# Patient Record
Sex: FEMALE | Race: WHITE | NOT HISPANIC OR LATINO | Employment: FULL TIME | URBAN - METROPOLITAN AREA
[De-identification: names, ages, dates, MRNs, and addresses within clinical notes are randomized per-mention and may not be internally consistent; named-entity substitution may affect disease eponyms.]

---
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
## 2020-03-03 ENCOUNTER — HOSPITAL ENCOUNTER (EMERGENCY)
Facility: CLINIC | Age: 20
Discharge: HOME | End: 2020-03-03
Attending: INTERNAL MEDICINE
Payer: COMMERCIAL

## 2020-03-03 VITALS
BODY MASS INDEX: 28.44 KG/M2 | HEIGHT: 72 IN | RESPIRATION RATE: 16 BRPM | SYSTOLIC BLOOD PRESSURE: 126 MMHG | TEMPERATURE: 98.2 F | DIASTOLIC BLOOD PRESSURE: 80 MMHG | HEART RATE: 78 BPM | OXYGEN SATURATION: 98 % | WEIGHT: 210 LBS

## 2020-03-03 DIAGNOSIS — S51.812A FOREARM LACERATION, LEFT, INITIAL ENCOUNTER: ICD-10-CM

## 2020-03-03 DIAGNOSIS — Z72.89 SELF-INJURIOUS BEHAVIOR: ICD-10-CM

## 2020-03-03 DIAGNOSIS — F41.1 GENERALIZED ANXIETY DISORDER: ICD-10-CM

## 2020-03-03 RX ORDER — LIDOCAINE HYDROCHLORIDE AND EPINEPHRINE 10; 10 MG/ML; UG/ML
10 INJECTION, SOLUTION INFILTRATION; PERINEURAL ONCE
Status: DISCONTINUED | OUTPATIENT
Start: 2020-03-03 | End: 2020-03-03 | Stop reason: HOSPADM

## 2020-03-03 ASSESSMENT — MIFFLIN-ST. JEOR: SCORE: 1839.55

## 2020-03-03 NOTE — DISCHARGE INSTRUCTIONS
Treating Anxiety Disorders with Therapy    If you have an anxiety disorder, you don t have to suffer anymore. Treatment is available. Therapy (also called counseling) is often a helpful treatment for anxiety disorders. With therapy, a specially trained professional (therapist) helps you face and learn to manage your anxiety. Therapy can be short-term or long-term depending on your needs. In some cases, medicine may also be prescribed with therapy. It may take time before you notice how much therapy is helping, but stick with it. With therapy, you can feel better.  Cognitive behavioral therapy (CBT)  Cognitive behavioral therapy (CBT) teaches you to manage anxiety. It does this by helping you understand how you think and act when you re anxious. Research has shown CBT to be a very effective treatment for anxiety disorders. How CBT is run is almost like a class. It involves homework and activities to build skills that teach you to cope with anxiety step by step. It can be done in a group or one-on-one, and often takes place for a set number of sessions. CBT has two main parts:  Cognitive therapy helps you identify the negative, irrational thoughts that occur with your anxiety. You ll learn to replace these with more positive, realistic thoughts.  Behavioral therapy helps you change how you react to anxiety. You ll learn coping skills and methods for relaxing to help you better deal with anxiety.  Other forms of therapy  Other therapy methods may work better for you than CBT. Or, you may move from CBT to another form of therapy as your treatment needs change. This may mean meeting with a therapist by yourself or in a group. Therapy can also help you work through problems in your life, such as drug or alcohol dependence, that may be making your anxiety worse.  Getting better takes time  Therapy will help you feel better and teach you skills to help manage anxiety long term. But change doesn t happen right away. It  takes a commitment from you. And treatment only works if you learn to face the causes of your anxiety. So, you might feel worse before you feel better. This can sometimes make it hard to stick with it. But remember: Therapy is a very effective treatment. The results will be well worth it.  Helping yourself  If anxiety is wearing you down, here are some things you can do to cope:  Check with your doctor and rule out any physical problems that may be causing the anxiety symptoms.  If an anxiety disorder is diagnosed, seek mental healthcare. This is an illness and it can respond to treatment. Most types of anxiety disorders will respond to talk therapy and medicine.  Educate yourself about anxiety disorders. Keep track of helpful online resources and books you can use during stressful periods.  Try stress management techniques such as meditation.  Consider online or in-person support groups.  Don t fight your feelings. Anxiety feeds itself. The more you worry about it, the worse it gets. Instead, try to identify what might have triggered your anxiety. Then try to put this threat in perspective.  Keep in mind that you can t control everything about a situation. Change what you can and let the rest take its course.  Exercise -- it s a great way to relieve tension and help your body feel relaxed.  Examine your life for stress, and try to find ways to reduce it.  Avoid caffeine and nicotine, which can make anxiety symptoms worse.  Fight the temptation to turn to alcohol or unprescribed drugs for relief. They only make things worse in the long run.   Date Last Reviewed: 1/1/2017 2000-2019 [employer] [address], [place] [address]. All rights reserved. This information is not intended as a substitute for professional medical care. Always follow your healthcare professional's instructions.    Please make an appointment to follow up with Your Primary Care Provider for suture removal in 7-10 days  even if entirely better.     Please make an appointment to follow up with Mental Health Providers as soon as possible even if entirely better.

## 2020-03-03 NOTE — ED AVS SNAPSHOT
[facility], [place], Emergency Department  [address] [place] [id number]  Phone:  [phone]                                    Licha Moreland   MRN: [id number]    Department:  [facility], Emergency Department   Date of Visit:  3/3/2020           After Visit Summary Signature Page    I have received my discharge instructions, and my questions have been answered. I have discussed any challenges I see with this plan with the nurse or doctor.    ..........................................................................................................................................  Patient/Patient Representative Signature      ..........................................................................................................................................  Patient Representative Print Name and Relationship to Patient    ..................................................               ................................................  Date                                   Time    ..........................................................................................................................................  Reviewed by Signature/Title    ...................................................              ..............................................  Date                                               Time          22EPIC Rev 08/18

## 2020-03-03 NOTE — ED PROVIDER NOTES
[place] EMERGENCY DEPARTMENT ([facility])  3/03/20    [facility] 10:37 AM   History     Chief Complaint   Patient presents with     Arm Injury     Intent for self harm     The history is provided by the patient and medical records.      Licha Moreland is a 19 year old female who presents the ED with self-inflicted laceration on her arm.  She has been engaging in self-injurious behavior for some time but has been more anxious and cut more deeply this time. This occurred at 9am. She was brought in by a friend. She denies suicidal ideation. She has multiple outpatient resources including a therapist and psychiatrist, has an appointment with her therapist on Friday. She has appointment with her other doctor on 7th.     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 Last Tdap was in 2012.      I have reviewed the Medications, Allergies, Past Medical and Surgical History, and Social History in the [employer] system.  PAST MEDICAL HISTORY: No past medical history on file.    PAST SURGICAL HISTORY:   Past Surgical History:   Procedure Laterality Date     TONSILLECTOMY      tonsillectomy       FAMILY HISTORY:   Family History   Problem Relation Age of Onset     Hypertension Father      Hypotension Maternal Grandmother      Heart Surgery Maternal Grandfather      Diabetes Maternal Grandfather        SOCIAL HISTORY:   Social History     Tobacco Use     Smoking status: Never Smoker     Smokeless tobacco: Never Used   Substance Use Topics     Alcohol use: No       Discharge Medication List as of 3/3/2020 12:23 PM      CONTINUE these medications which have NOT CHANGED    Details   busPIRone (BUSPAR) 15 MG tablet Take 15 mg by mouth 2 times daily, Historical      !! escitalopram (LEXAPRO) 10 MG tablet Take 1 tablet (10 mg) by mouth daily, Disp-90 tablet, R-1, E-Prescribe      !! escitalopram (LEXAPRO) 20 MG tablet Take 1 tablet (20 mg) by mouth daily, Disp-90 tablet, R-1, E-Prescribe       !! - Potential duplicate medications found. Please  discuss with provider.           No Known Allergies     Review of Systems    Physical Exam   BP: (!) 141/73  Pulse: 86  Heart Rate: 86  Temp: 98.2  F (36.8  C)  Resp: 18  Height: 182.9 cm (6')  Weight: 95.3 kg (210 lb)  SpO2: 96 %      Physical Exam  Constitutional:       General: She is not in acute distress.     Appearance: She is not diaphoretic.   HENT:      Head: Atraumatic.      Mouth/Throat:      Pharynx: No oropharyngeal exudate.   Eyes:      General: No scleral icterus.     Pupils: Pupils are equal, round, and reactive to light.   Cardiovascular:      Heart sounds: Normal heart sounds.   Pulmonary:      Effort: No respiratory distress.      Breath sounds: Normal breath sounds.   Abdominal:      General: Bowel sounds are normal.      Palpations: Abdomen is soft.      Tenderness: There is no abdominal tenderness.   Musculoskeletal:         General: No tenderness.   Skin:     General: Skin is warm.      Findings: Laceration present. No rash.          Neurological:      General: No focal deficit present.      Cranial Nerves: No cranial nerve deficit.      Motor: No weakness.   Psychiatric:         Attention and Perception: Attention normal.         Mood and Affect: Affect normal. Mood is anxious. Mood is not elated. Affect is not blunt, angry or inappropriate.         Speech: Speech normal.         Behavior: Behavior normal.         Thought Content: Thought content is not paranoid or delusional. Thought content does not include homicidal or suicidal ideation. Thought content does not include homicidal or suicidal plan.         ED Course        [facility] [place]    -Laceration Repair  Date/Time: 3/3/2020 4:02 PM  Performed by: Hui Figueroa MD  Authorized by: Hui Figueroa MD       ANESTHESIA (see MAR for exact dosages):     Anesthesia method:  Local infiltration    Local anesthetic:  Lidocaine 1% WITH epi  LACERATION DETAILS     Location:  Shoulder/arm    Shoulder/arm  location:  L lower arm    Length (cm):  6    REPAIR TYPE:     Repair type:  Simple      EXPLORATION:     Wound exploration: wound explored through full range of motion and entire depth of wound probed and visualized      Contaminated: no      TREATMENT:     Area cleansed with:  Saline    Amount of cleaning:  Standard    Irrigation solution:  Sterile saline    Irrigation method:  Pressure wash    Visualized foreign bodies/material removed: no      SKIN REPAIR     Repair method:  Sutures    Suture size:  4-0    Suture material:  Nylon    Suture technique:  Simple interrupted    Number of sutures:  6    APPROXIMATION     Approximation:  Close    POST-PROCEDURE DETAILS     Dressing:  Antibiotic ointment, non-adherent dressing and bulky dressing      PROCEDURE   Patient Tolerance:  Patient tolerated the procedure well with no immediate complications                No results found for this or any previous visit (from the past 24 hour(s)).  Medications - No data to display          Assessments & Plan (with Medical Decision Making)    Left forearm lac for self injurious behavior /generalized anxiety disorder- denies SI HI or any psychotic features, good support with her partner and various Mental Health providers, s/p repair as above, SR in 7-10 days, follow up with her MH providers as soon as possible.         I have reviewed the nursing notes.    I have reviewed the findings, diagnosis, plan and need for follow up with the patient.    Discharge Medication List as of 3/3/2020 12:23 PM          Final diagnoses:   Forearm laceration, left, initial encounter   Self-injurious behavior   Generalized anxiety disorder       3/3/2020   [facility], [place], EMERGENCY DEPARTMENT     Hui Figueroa MD  03/03/20 [id number]

## 2020-03-03 NOTE — ED TRIAGE NOTES
18 y/o F - presenting to ED for eval of laceration to left lower forearm; wound is wrapped from home (not visualized).  Patient's intent to harm self (no kill self).  Roommate with patient.  Last Tdap booster: 2017.

## 2021-09-28 NOTE — TELEPHONE ENCOUNTER
DIAGNOSIS: Concussion/Self Referred/BCBS/Concussion Con  Rugby Injury   APPOINTMENT DATE: 10.4.21   NOTES STATUS DETAILS   OFFICE NOTE from referring provider N/A    OFFICE NOTE from other specialist N/A    DISCHARGE SUMMARY from hospital N/A    DISCHARGE REPORT from the ER Care Everywhere 9.25.21 Urgency Room   OPERATIVE REPORT N/A    EMG report N/A    MEDICATION LIST Internal    MRI N/A    DEXA (osteoporosis/bone health) N/A    CT SCAN N/A    XRAYS (IMAGES & REPORTS) N/A

## 2021-10-04 ENCOUNTER — OFFICE VISIT (OUTPATIENT)
Dept: ORTHOPEDICS | Facility: CLINIC | Age: 21
End: 2021-10-04
Payer: COMMERCIAL

## 2021-10-04 ENCOUNTER — PRE VISIT (OUTPATIENT)
Dept: ORTHOPEDICS | Facility: CLINIC | Age: 21
End: 2021-10-04

## 2021-10-04 VITALS — WEIGHT: 280 LBS | HEIGHT: 72 IN | BODY MASS INDEX: 37.93 KG/M2

## 2021-10-04 DIAGNOSIS — S06.0X0D CONCUSSION WITHOUT LOSS OF CONSCIOUSNESS, SUBSEQUENT ENCOUNTER: Primary | ICD-10-CM

## 2021-10-04 PROCEDURE — 99203 OFFICE O/P NEW LOW 30 MIN: CPT | Performed by: STUDENT IN AN ORGANIZED HEALTH CARE EDUCATION/TRAINING PROGRAM

## 2021-10-04 ASSESSMENT — MIFFLIN-ST. JEOR: SCORE: 2147.07

## 2021-10-04 NOTE — LETTER
Date:October 5, 2021      Patient was self referred, no letter generated. Do not send.        [facility] Health Information

## 2021-10-04 NOTE — PROGRESS NOTES
[facility]  Sports Medicine Clinic  Clinics and Surgery Center           SUBJECTIVE       Licha Moreland is a 21 year old female presenting to clinic today with concerns for a concussion.    Background:   Date of injury: 09/25/2021  Duration of symptoms: 9 days   Mechanism of Injury: Rugby - patient relates that her jaw hit someone's shoulder, then fell backwards and hit her head on the ground. Reports that she did not lose consciousness   Intensity: 1/10 - for the last few days she reports to no longer having constant headache but has intermittent headaches.   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    Prior Evaluation: ED, 9/25/21. Concern for concussion without loss of consciousness. Discharged home safely with precautions.     21-year-old presenting with history of concussion on 9/25/2021.  Her main symptoms previously were headache, dizziness, and nausea.  Over this past weekend she feels as though she is getting better.  She plays rugby in adult league.  Goes to school at [employer] and in classes twice daily.  Since this past weekend, the nausea and dizziness has resolved.  Her headaches still remain intermittently but sometimes can last a day.  Not accompanied by vision change or hearing loss.  She takes ibuprofen which is mildly helpful.  No concussions previously.  History of anxiety previously treated with Lexapro and BuSpar.  No longer on those medications.      PMH, Medications and Allergies were reviewed and updated as needed.    ROS:  As noted above otherwise negative.    There is no problem list on file for this patient.      No current outpatient medications on file.            OBJECTIVE:       Vitals:   Vitals:    10/04/21 1444   Weight: 127 kg (280 lb)   Height: 1.829 m (6')     BMI: Body mass index  is 37.97 kg/m .    Gen:  Well nourished and in no acute distress  HEENT: Extraocular movement intact  Neck: Supple  Pulm:  Breathing Comfortably. No increased respiratory effort.  Psych: Euthymic. Appropriately answers questions    MSK: Normal strength upper and lower extremities, 5/5.    Neuro: No focal deficits noted.  Normal DTRs.    VOMS  -Smooth Pursuits: nml  -Saccades (Horizontal): Symptomatic increase in headache  -Saccades (Vertical): nml  -Convergence (cm) x 3: 8 cm, 10 cm, 12 cm  -VOR (Horizontal): Symptomatic increase in headache  -VOR (Vertical): nml  -Visual Motion Sensitivity Testing: nml            ASSESSMENT and PLAN:     Licha was seen today for consult.    Diagnoses and all orders for this visit:    Concussion without loss of consciousness, subsequent encounter    Jason is a 21-year-old female presenting 9 days after concussion.  She is overall progressing well but still has symptomatic headaches with certain activities.  She has not returned to sport.  She has returned to class without much difficulty.  This is her first concussion, without loss of consciousness.  Does have a history of behavioral manifestations including anxiety.    Plan: After discussion with Licha in terms of treatment, do not think she should return to activity at this time.  She is showing gross symptomatology with horizontal eye tracking and increasing in headache.  She is neuro neurologically intact without focal deficits.  I do recommend light activity including walking and walking her dog.  Outside of that I do not think she should began a more aggressive approach.  She does not have an  at her adult rugby team.  I would like to see her again in 2 weeks for reassessment, at that time hopeful return to play protocol could be implemented.  I think her overall prognosis would be within 4 weeks from the date of injury.  In terms of school, I have given her an excuse for the past week that she has missed.  I  have also allowed her to have leniency and work assignments until she is seen again.  She should use ibuprofen as needed for her headaches.  She is not having any sleep disturbances.  While looking at the screen whether the TV or computer, recommended bluelight blocking glasses.      Options for treatment and/or follow-up care were reviewed with the patient was actively involved in the decision making process. Patient verbalized understanding and was in agreement with the plan.    Harry Alves DO  , Sports Medicine  Department of Family Medicine and [facility]

## 2021-10-04 NOTE — LETTER
October 4, 2021      Licha Moreland  [address] [address]        To Whom It May Concern,     Licha Moreland attended clinic here on Oct 4, 2021 and may return to school on 10/4/21. She should be allowed extra time to finish assignments and quizzes as she is dealing with a concussion. She will see me in two weeks for re-evaluation. .    If you have questions or concerns, please call the clinic at the number listed above.    Sincerely,         Harry Alves, DO

## 2021-10-04 NOTE — LETTER
10/4/2021      RE: Licha Moreland  [address] [facility] [address]       [facility]  Sports Medicine Clinic  Clinics and Surgery Center           SUBJECTIVE       Licha Moreland is a 21 year old female presenting to clinic today with concerns for a concussion.    Background:   Date of injury: 09/25/2021  Duration of symptoms: 9 days   Mechanism of Injury: Rugby - patient relates that her jaw hit someone's shoulder, then fell backwards and hit her head on the ground. Reports that she did not lose consciousness   Intensity: 1/10 - for the last few days she reports to no longer having constant headache but has intermittent headaches.   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    Prior Evaluation: ED, 9/25/21. Concern for concussion without loss of consciousness. Discharged home safely with precautions.     21-year-old presenting with history of concussion on 9/25/2021.  Her main symptoms previously were headache, dizziness, and nausea.  Over this past weekend she feels as though she is getting better.  She plays rugby in adult league.  Goes to school at [employer] and in classes twice daily.  Since this past weekend, the nausea and dizziness has resolved.  Her headaches still remain intermittently but sometimes can last a day.  Not accompanied by vision change or hearing loss.  She takes ibuprofen which is mildly helpful.  No concussions previously.  History of anxiety previously treated with Lexapro and BuSpar.  No longer on those medications.      PMH, Medications and Allergies were reviewed and updated as needed.    ROS:  As noted above otherwise negative.    There is no problem list on file for this patient.      No current outpatient medications on file.            OBJECTIVE:       Vitals:   Vitals:     10/04/21 1444   Weight: 127 kg (280 lb)   Height: 1.829 m (6')     BMI: Body mass index is 37.97 kg/m .    Gen:  Well nourished and in no acute distress  HEENT: Extraocular movement intact  Neck: Supple  Pulm:  Breathing Comfortably. No increased respiratory effort.  Psych: Euthymic. Appropriately answers questions    MSK: Normal strength upper and lower extremities, 5/5.    Neuro: No focal deficits noted.  Normal DTRs.    VOMS  -Smooth Pursuits: nml  -Saccades (Horizontal): Symptomatic increase in headache  -Saccades (Vertical): nml  -Convergence (cm) x 3: 8 cm, 10 cm, 12 cm  -VOR (Horizontal): Symptomatic increase in headache  -VOR (Vertical): nml  -Visual Motion Sensitivity Testing: nml            ASSESSMENT and PLAN:     Licha was seen today for consult.    Diagnoses and all orders for this visit:    Concussion without loss of consciousness, subsequent encounter    Jason is a 21-year-old female presenting 9 days after concussion.  She is overall progressing well but still has symptomatic headaches with certain activities.  She has not returned to sport.  She has returned to class without much difficulty.  This is her first concussion, without loss of consciousness.  Does have a history of behavioral manifestations including anxiety.    Plan: After discussion with Licha in terms of treatment, do not think she should return to activity at this time.  She is showing gross symptomatology with horizontal eye tracking and increasing in headache.  She is neuro neurologically intact without focal deficits.  I do recommend light activity including walking and walking her dog.  Outside of that I do not think she should began a more aggressive approach.  She does not have an  at her adult rugby team.  I would like to see her again in 2 weeks for reassessment, at that time hopeful return to play protocol could be implemented.  I think her overall prognosis would be within 4 weeks from the date of injury.  In  terms of school, I have given her an excuse for the past week that she has missed.  I have also allowed her to have leniency and work assignments until she is seen again.  She should use ibuprofen as needed for her headaches.  She is not having any sleep disturbances.  While looking at the screen whether the TV or computer, recommended bluelight blocking glasses.      Options for treatment and/or follow-up care were reviewed with the patient was actively involved in the decision making process. Patient verbalized understanding and was in agreement with the plan.    Harry Alves DO  , Sports Medicine  Department of Family Medicine and [facility]        Harry Alves, DO

## 2025-01-14 ENCOUNTER — OFFICE VISIT (OUTPATIENT)
Dept: OBGYN | Facility: CLINIC | Age: 25
End: 2025-01-14
Payer: COMMERCIAL

## 2025-01-14 VITALS
HEIGHT: 72 IN | DIASTOLIC BLOOD PRESSURE: 82 MMHG | HEART RATE: 77 BPM | BODY MASS INDEX: 30.61 KG/M2 | OXYGEN SATURATION: 100 % | WEIGHT: 226 LBS | SYSTOLIC BLOOD PRESSURE: 129 MMHG

## 2025-01-14 DIAGNOSIS — N76.0 BV (BACTERIAL VAGINOSIS): ICD-10-CM

## 2025-01-14 DIAGNOSIS — B37.31 YEAST INFECTION OF THE VAGINA: ICD-10-CM

## 2025-01-14 DIAGNOSIS — L29.3 PERINEAL ITCHING, FEMALE: ICD-10-CM

## 2025-01-14 DIAGNOSIS — B96.89 BV (BACTERIAL VAGINOSIS): ICD-10-CM

## 2025-01-14 DIAGNOSIS — Z12.4 CERVICAL CANCER SCREENING: ICD-10-CM

## 2025-01-14 DIAGNOSIS — Z11.3 SCREENING EXAMINATION FOR VENEREAL DISEASE: ICD-10-CM

## 2025-01-14 DIAGNOSIS — Z21 ASYMPTOMATIC HUMAN IMMUNODEFICIENCY VIRUS (HIV) INFECTION STATUS (H): ICD-10-CM

## 2025-01-14 DIAGNOSIS — L30.9 DERMATITIS: ICD-10-CM

## 2025-01-14 DIAGNOSIS — Z01.419 WELL WOMAN EXAM WITH ROUTINE GYNECOLOGICAL EXAM: Primary | ICD-10-CM

## 2025-01-14 LAB
C TRACH DNA SPEC QL NAA+PROBE: NEGATIVE
CLUE CELLS: PRESENT
EST. AVERAGE GLUCOSE BLD GHB EST-MCNC: 100 MG/DL
HBA1C MFR BLD: 5.1 % (ref 0–5.6)
N GONORRHOEA DNA SPEC QL NAA+PROBE: NEGATIVE
SPECIMEN TYPE: NORMAL
SPECIMEN TYPE: NORMAL
T PALLIDUM AB SER QL: NONREACTIVE
TRICHOMONAS, WET PREP: ABNORMAL
WBC'S/HIGH POWER FIELD, WET PREP: ABNORMAL
YEAST, WET PREP: PRESENT

## 2025-01-14 PROCEDURE — 87102 FUNGUS ISOLATION CULTURE: CPT

## 2025-01-14 PROCEDURE — 87210 SMEAR WET MOUNT SALINE/INK: CPT

## 2025-01-14 PROCEDURE — 87491 CHLMYD TRACH DNA AMP PROBE: CPT

## 2025-01-14 PROCEDURE — 87591 N.GONORRHOEAE DNA AMP PROB: CPT

## 2025-01-14 PROCEDURE — 83036 HEMOGLOBIN GLYCOSYLATED A1C: CPT

## 2025-01-14 PROCEDURE — 86780 TREPONEMA PALLIDUM: CPT

## 2025-01-14 RX ORDER — BETAMETHASONE DIPROPIONATE 0.5 MG/G
CREAM TOPICAL 2 TIMES DAILY
Qty: 15 G | Refills: 1 | Status: SHIPPED | OUTPATIENT
Start: 2025-01-14 | End: 2025-02-13

## 2025-01-14 RX ORDER — LEVOTHYROXINE SODIUM 75 UG/1
75 TABLET ORAL DAILY
COMMUNITY

## 2025-01-14 RX ORDER — METRONIDAZOLE 500 MG/1
500 TABLET ORAL 2 TIMES DAILY
Qty: 14 TABLET | Refills: 0 | Status: SHIPPED | OUTPATIENT
Start: 2025-01-14 | End: 2025-01-21

## 2025-01-14 RX ORDER — ALUMINUM CHLORIDE 20 %
SOLUTION, NON-ORAL TOPICAL AT BEDTIME
COMMUNITY
Start: 2024-09-20 | End: 2025-09-15

## 2025-01-14 RX ORDER — LIOTHYRONINE SODIUM 5 UG/1
10 TABLET ORAL DAILY
COMMUNITY

## 2025-01-14 RX ORDER — FLUCONAZOLE 150 MG/1
TABLET ORAL
Qty: 2 TABLET | Refills: 0 | Status: SHIPPED | OUTPATIENT
Start: 2025-01-14

## 2025-01-14 NOTE — PROGRESS NOTES
"Well Woman Exam Note    SUBJECTIVE:  Licha Moreland is a 24 year old female  who presents today for her well woman exam.    Concerns today include:     Perineal itching: x many months, intermittent. Has not tried and OTC therapies. Denies any abnormal discharge, odor, pain, bleeding, or skin changes.     Menstrual History: Monthly cycles. No contraception.      2019     2:20 PM 10/16/2019     1:40 PM 2025    10:30 AM   Menstrual History   LAST MENSTRUAL PERIOD 2019 10/9/2019 2024     Sexual history: Monogamous. 1 female partner.    Diet/Exercise: Stable    Mental Health: Stable    She has no bowel/bladder concerns today    Last pap: No results found for: \"PAP\"   History of abnormal Pap smear: No - age 21-29 PAP every 3 years recommended  Pap obtained today:  Yes    Mammogram current: N/A    Lipids: Check today    Diabetes Screening: Check today    Colonoscopy: N/A  (Recommended screening begins at age 45 years old)    Lung cancer screening: N/A  (Recommended yearly for people who: have a 20 pack-year or more smoking history AND smoke now or have quit within the past 15 years AND are between 50-80 years old)    DEXA scan: N/A  (Recommended screening begins at age 65 years old)    HISTORY:  Prescription Medications as of 2025         Rx Number Disp Refills Start End Last Dispensed Date Next Fill Date Owning Pharmacy    DRYSOL 20 % external solution  -- -- 2024 9/15/2025       Sig: Apply topically at bedtime.    Class: Historical    Route: Topical    levothyroxine (SYNTHROID/LEVOTHROID) 75 MCG tablet  -- --  --       Sig: Take 75 mcg by mouth daily.    Class: Historical    Route: Oral    liothyronine (CYTOMEL) 5 MCG tablet  -- --  --       Sig: Take 10 mcg by mouth daily.    Class: Historical    Route: Oral          No Known Allergies  Immunization History   Administered Date(s) Administered    COVID-19 Monovalent 12+ (Pfizer ) 2022    COVID-19 Monovalent 18+ " "(Moderna) 2021, 2021    TDAP (Adacel,Boostrix) 2012, 2023       OB History    Para Term  AB Living   0 0 0 0 0 0   SAB IAB Ectopic Multiple Live Births   0 0 0 0 0      No past medical history on file.  Past Surgical History:   Procedure Laterality Date    TONSILLECTOMY      tonsillectomy    TONSILLECTOMY & ADENOIDECTOMY       Family History   Problem Relation Age of Onset    Hypertension Father     Hypotension Maternal Grandmother     Heart Surgery Maternal Grandfather     Diabetes Maternal Grandfather      Social History     Socioeconomic History    Marital status: Single     Spouse name: None    Number of children: None    Years of education: None    Highest education level: None   Tobacco Use    Smoking status: Never    Smokeless tobacco: Never   Substance and Sexual Activity    Alcohol use: No    Drug use: No    Sexual activity: Yes     Partners: Female     Birth control/protection: None   Social History Narrative    Preload - 2012     Social Drivers of Health      [facility]    Social Connections       REVIEW of SYSTEMS:  ROS: 10 point ROS neg other than the symptoms noted above in the HPI.     EXAM:  Blood pressure 129/82, pulse 77, height 1.854 m (6' 1\"), weight 102.5 kg (226 lb), last menstrual period 2024, SpO2 100%, not currently breastfeeding. Body mass index is 29.82 kg/m .  General - pleasant female in no acute distress.  Skin - no suspicious lesions or rashes  EENT-  PERRLA, euthyroid with out palpable nodules  Neck - supple without lymphadenopathy.  Lungs - clear to auscultation bilaterally.  Heart - regular rate and rhythm without murmur.  Abdomen - soft, nontender, nondistended, no masses or organomegaly noted.  Musculoskeletal - no gross deformities.  Neurological - normal strength, sensation, and mental status.    Breast Exam:  Breasts: deferred.     Pelvic Exam:  EG/BUS: Normal genital architecture " without lesions, erythema or abnormal secretions Bartholin's, Urethra, [place]'s normal. Perineum and surrounding anus show rash-like erythema. No lesions, masses, discharge.   Urethral meatus: normal   Urethra: no masses, tenderness, or scarring   Bladder: no masses or tenderness   Vagina: moist, pink, rugae with creamy, white, and odorless  secretions  Cervix: Nulliparous,, no lesions, and pink, moist, closed, without lesion or CMT  Uterus: anteverted,  and small, smooth, firm, mobile w/o pain  Adnexa: Within normal limits and No masses, nodularity, tenderness  Rectum: hemorrhoid present    ASSESSMENT/PLAN:  (Z01.419) Well woman exam with routine gynecological exam  (primary encounter diagnosis)  Comment: Additional teaching done at this visit included: exercise, birth control, mental health, and lipids; Discussed with patient risks/ benefits and treatment options of prescribed medications or other treatment modalities; RTC in one year for annual exam or with concerns.   Plan: Lipid panel reflex to direct LDL Fasting,         Hemoglobin A1c, Hepatitis B surface antigen,         Hepatitis C Screen Reflex to HCV RNA Quant and         Genotype, HIV Antigen Antibody Combo Cascade,         Treponema Abs w Reflex to RPR and Titer,         Chlamydia trachomatis PCR, Neisseria         gonorrhoeae PCR, Pap Screen Only - Recommended         Age 21 - 24 Years          (Z11.3) Screening examination for venereal disease  Plan: Hepatitis B surface antigen, Hepatitis C Screen        Reflex to HCV RNA Quant and Genotype, HIV         Antigen Antibody Combo Cascade, Treponema Abs w        Reflex to RPR and Titer, Chlamydia trachomatis         PCR, Neisseria gonorrhoeae PCR          (L29.3) Perineal itching, female  Comment: We discussed my PE findings and that we will rule out infectious cause, but am more suspecting that she has some contact dermatitis.  Plan: Fungal or Yeast Culture Routine, Wet         preparation          (L30.9)  Dermatitis  Comment: We discussed initiation of script below 1-2x/day for 2-4 weeks. Reach out if not improving. We discussed keeping area dry. We discussed wearing more breathable clothing since I suspect the area is exposed to excessive moisture. When I informed the patient of this, she did state that she noticed the itching is worse when she is at work all day and sitting.  Plan: augmented betamethasone dipropionate (DIPROLENE        AF) 0.05 % external cream          (Z12.4) Cervical cancer screening  Plan: Pap Screen Only - Recommended Age 21 - 24 Years          FRANSISCA Slade CNP

## 2025-01-14 NOTE — PATIENT INSTRUCTIONS
If you have labs or imaging done, the results will automatically release in [facility] without an interpretation.  Your health care professional will review those results and send an interpretation with recommendations as soon as possible, but this may be 1-3 business days.    If you have any questions regarding your visit, please contact your care team.     [facility]: [phone]  [facility] CLINIC HOURS TELEPHONE NUMBER   Pauly Duran, BECCA Potter-ALIREZA Doe-ALIREZA Gold-Surgery Scheduler  Monica-       Monday- [place]  8:00 am-4:00 pm    Tuesday- [place]  8:00 am-4:00 pm    Wednesday- [place] 8:00 am-4:00 pm    Thursday- [place] 8:00 am-4:00 pm    Friday- [place]  8:00 am-4:00 pm [facility]  [address] [place]  [place] [address]  PH: [phone]  Fax: [phone]    Imaging Scheduling all locations  PH: [phone]     [facility] Delivery  [address] [place] [address] [address].  [place], [place] [address] [phone]    [facility]  [address] [place] [address]  PH: [phone]     **Surgeries** Our Surgery Schedulers will contact you to schedule. If you do not receive a call within 3 business days, please call [phone].  Urgent Care locations:  [facility]       Monday-Friday   10 am - 8 pm    Saturday and Sunday   9 am - 5 pm   [phone]   If you need a medication refill, please contact your pharmacy. Please allow 3 business days for your refill to be completed.  As always, Thank you for trusting us with your healthcare needs!

## 2025-01-15 LAB
CHOLEST SERPL-MCNC: 143 MG/DL
FASTING STATUS PATIENT QL REPORTED: YES
HBV SURFACE AG SERPL QL IA: NONREACTIVE
HCV AB SERPL QL IA: NONREACTIVE
HDLC SERPL-MCNC: 37 MG/DL
HIV 1+2 AB+HIV1 P24 AG SERPL QL IA: REACTIVE
HIV 1+2 AB+HIV1P24 AG SERPLBLD IA.RAPID: NORMAL
HIV 2 AB SERPLBLD QL IA.RAPID: NONREACTIVE
HIV1 AB SERPLBLD QL IA.RAPID: NONREACTIVE
LDLC SERPL CALC-MCNC: 84 MG/DL
NONHDLC SERPL-MCNC: 106 MG/DL
TRIGL SERPL-MCNC: 110 MG/DL

## 2025-01-16 LAB
BACTERIA VAG AEROBE CULT: NORMAL
HIV1 RNA # PLAS NAA DL=20: NOT DETECTED COPIES/ML

## 2025-01-18 LAB — BACTERIA VAG AEROBE CULT: NO GROWTH
